# Patient Record
Sex: MALE | URBAN - METROPOLITAN AREA
[De-identification: names, ages, dates, MRNs, and addresses within clinical notes are randomized per-mention and may not be internally consistent; named-entity substitution may affect disease eponyms.]

---

## 2021-02-10 ENCOUNTER — APPOINTMENT (RX ONLY)
Dept: URBAN - METROPOLITAN AREA CLINIC 45 | Facility: CLINIC | Age: 36
Setting detail: DERMATOLOGY
End: 2021-02-10

## 2021-02-10 DIAGNOSIS — L72.8 OTHER FOLLICULAR CYSTS OF THE SKIN AND SUBCUTANEOUS TISSUE: ICD-10-CM

## 2021-02-10 DIAGNOSIS — L01.01 NON-BULLOUS IMPETIGO: ICD-10-CM

## 2021-02-10 PROCEDURE — ? COUNSELING

## 2021-02-10 PROCEDURE — ? TREATMENT REGIMEN

## 2021-02-10 PROCEDURE — ? PRESCRIPTION

## 2021-02-10 PROCEDURE — 99203 OFFICE O/P NEW LOW 30 MIN: CPT

## 2021-02-10 RX ORDER — SULFAMETHOXAZOLE AND TRIMETHOPRIM 800; 160 MG/1; MG/1
TABLET ORAL
Qty: 28 | Refills: 0 | Status: ERX | COMMUNITY
Start: 2021-02-10

## 2021-02-10 RX ORDER — DOXYCYCLINE HYCLATE 200 MG/1
TABLET, DELAYED RELEASE ORAL
Qty: 30 | Refills: 3 | Status: ERX | COMMUNITY
Start: 2021-02-10

## 2021-02-10 RX ADMIN — DOXYCYCLINE HYCLATE TAKE: 200 TABLET, DELAYED RELEASE ORAL at 00:00

## 2021-02-10 RX ADMIN — SULFAMETHOXAZOLE AND TRIMETHOPRIM TAKE: 800; 160 TABLET ORAL at 00:00

## 2021-02-10 NOTE — PROCEDURE: TREATMENT REGIMEN
Plan: Location: scrotum \\nPrescribed: Doryx 200 mg tablet,delayed release \\n\\n**patient declines any photos** \\n\\nPatient is here for an evaluation of cysts on the scrotum. \\nHe mentions these are very embarrassing and he is hesitating to be seen for this. \\nHe mentions that these become very painful and often start oozing. \\nHe says that he recently went to the emergency room because he was so concerned with these. \\nHe states that they lanced and drained the larger cysts, but now he is concerned that these are infected.\\nHe mentions that he was previously on drugs and living in a very unclean environment, but he is now sober and living in a hotel. \\n\\nDiscussed with patient that the drugs can take a toll on the body as a whole so just getting sober will likely help his skin tremendously. \\nDiscussed that today, we will prescribe Doryx 200 mg tablet,delayed release to help fight off any infection that may be present. \\nDiscussed that at his follow up we may need to inject this with 5FU/K40 or we may possibly need to excise this in the future. \\nHe is to take his Doryx daily and follow up as directed. \\n\\nF/u two months

## 2021-02-10 NOTE — PROCEDURE: TREATMENT REGIMEN
Plan: Location: arms, abdomen and scrotum \\nPrescribe: Bactrim  mg-160 mg tablet (Take 1 tablet by mouth twice a day for 2 weeks)\\n\\nPt has irritation throughout the body today.\\nPt discussed that it is very itchy and inflamed today.\\nToday there is honey yellow crust developing around the inflammation.\\nDiscussed with pt that this is most likely impetigo.\\nDiscussed with pt that this is a bacterial skin infection that often comes on irritated skin.\\nDiscussed being gentle with the skin---no peroxide or etoh, no neosporin.\\n\\nWill also prescribe pt Bactrim  mg-160 mg tablet (Take 1 tablet by mouth twice a day for 2 weeks) to help fight infection.\\n\\nF/u as needed